# Patient Record
(demographics unavailable — no encounter records)

---

## 2024-11-26 NOTE — ADDENDUM
[FreeTextEntry1] : Documented by Dg Murphy acting as a scribe for Dr. Gm Torre on 11/26/2024. All medical record entries made by the Scribe were at my, Dr. Gm Torre's, direction and personally dictated by me on 11/26/2024. I have reviewed the chart and agree that the record accurately reflects my personal performance of the history, physical exam, assessment and plan. I have also personally directed, reviewed, and agree with the discharge instructions.

## 2024-11-26 NOTE — PROCEDURE
[FreeTextEntry1] : Full PFT reveals normal flows; FEV1 was 3.87 L which is 104% of predicted; normal lung volumes; normal diffusion at 29.02, which is 98% of predicted; normal flow volume loop.  PFTs were performed to evaluate for SOB  FENO was 29; a normal value being less than 25 Fractional exhaled nitric oxide (FENO) is regarded as a simple, noninvasive method for assessing eosinophilic airway inflammation. Produced by a variety of cells within the lung, nitric oxide (NO) concentrations are generally low in healthy individuals. However, high concentrations of NO appear to be involved in nonspecific host defense mechanisms and chronic inflammatory diseases such as asthma. The American Thoracic Society (ATS) therefore has recommended using FENO to aid in the diagnosis and monitoring of eosinophilic airway inflammation and asthma, and for identifying steroid responsive individuals whose chronic respiratory symptoms may be caused by airway inflammation.

## 2024-11-26 NOTE — HISTORY OF PRESENT ILLNESS
[TextBox_4] : Mr. SHAWN PINK is a 32 year old, non-cigarette smoking, male. He has past medical history of Covid-19 (12/2021 - no hospitalization), Exercise induced asthma, Seasonal allergies (Spring known trigger), & GERD. 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 He presents to the office for follow-up visit.  -he notes feeling generally well -he notes he is recovering from a cold -he notes new DD for his GISELL -he notes pending repeat sleep study -he notes his vitamin D level is low -he notes losing weight is a challenge and stamina is somewhat decreased from prior  -he denies any headaches, nausea, emesis, fever, chills, sweats, chest pain, chest pressure, coughing, wheezing, palpitations, diarrhea, constipation, dysphagia, vertigo, arthralgias, myalgias, leg swelling, itchy eyes, itchy ears, heartburn, reflux, or sour taste in the mouth.

## 2024-11-26 NOTE — PHYSICAL EXAM
[No Acute Distress] : no acute distress [Normal Oropharynx] : normal oropharynx [Normal Appearance] : normal appearance [No Neck Mass] : no neck mass [Normal Rate/Rhythm] : normal rate/rhythm [Normal S1, S2] : normal s1, s2 [No Murmurs] : no murmurs [No Resp Distress] : no resp distress [Clear to Auscultation Bilaterally] : clear to auscultation bilaterally [No Abnormalities] : no abnormalities [Benign] : benign [Normal Gait] : normal gait [No Clubbing] : no clubbing [No Cyanosis] : no cyanosis [No Edema] : no edema [FROM] : FROM [Normal Color/ Pigmentation] : normal color/ pigmentation [No Focal Deficits] : no focal deficits [Oriented x3] : oriented x3 [Normal Affect] : normal affect [TextBox_68] : I:E ratio 1:3; clear

## 2024-11-26 NOTE — ASSESSMENT
[FreeTextEntry1] : Mr. SHAWN PINK is a 32 year old, non-cigarette smoking, male with a past medical history of Covid-19 (12/2021 - no hospitalization), Exercise induced asthma, Seasonal allergies (Spring known trigger), & GERD, elevated IgE, elevated LFT. 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 Avoidance of cold air, extremes of temperature, rescue inhaler should be used before exercise. Order of medication reviewed with patient. Recommended use of a cool mist humidifier in the bedroom. - Asthma is believed to be caused by inherited (genetic) and environmental factor, but its exact cause is unknown.  - Asthma may be triggered by allergens, lung infections, or irritants in the air. Asthma triggers are different for each person  problem 1A: Elevated IgE (377) -Xolair candidate if necessary -Xolair is a recombinant DNA- derived humanized IgG1K monoclonal antibody that selectively binds ot human immunoglobulin E (IgE). Xolair is produced by a Chinese hamster ovary cell suspension culture in nutrient medium containing the antibiotic gentamicin. Gentamicin is not detectable in the final product. Xolair is a sterile, white, preservative free, lyophilized powder contained in a single use vial that is reconstituted with sterile water for suspension. Side effects include: wheezing, tightness of the chest, trouble breathing, hives, skin rash, feeling anxious or light-headed, fainting, warmth or tingling under skin, or swelling of face, lips, or tongue   problem 2: Allergies -add Olopatadine 0.2% eye drops BID Environmental measures for allergies were encouraged including mattress and pillow covers, air purifier, and environmental controls.   Problem 3: GERD -Rule of 2s: avoid eating too much, eating too late, eating too spicy, eating two hours before bed. -Things to avoid including overeating, spicy foods, tight clothing, eating within three hours of bed, this list is not all inclusive. -For treatment of reflux, possible options discussed including diet control, H2 blockers, PPIs, as well as coating motility agents discussed as treatment options. Timing of meals and proximity of last meal to sleep were discussed. If symptoms persist, a formal gastrointestinal evaluation is needed.   Problem 4: (+) GISELL (Risk factors: snoring, elevated Mallampati class, neck size 17) - mild -s/p home sleep study  -DD in place - pending new HSS -Sleep apnea is associated with adverse clinical consequences which can affect most organ systems. Cardiovascular disease risk includes arrhythmias, atrial fibrillation, hypertension, coronary artery disease, and stroke. Metabolic disorders include diabetes type 2, non-alcoholic fatty liver disease. Mood disorder especially depression; and cognitive decline especially in the elderly. Associations with chronic reflux/Alberto's esophagus some but now all inclusive. -Reasons include arousal consistent with hypopnea; respiratory events most prominent in REM sleep or supine position; therefore sleep staging and body position are important for accurate diagnosis and estimation of AHI.   Problem 5: poor breathing mechanics -Proper breathing techniques were reviewed with an emphasis of exhalation. Patient instructed to breath in for 1 second and out for four seconds. Patient was encouraged to not talk while walking.   problem 6: overweight / out of shape -Weight loss, exercise, and diet control were discussed and are highly encouraged. Treatment options are given such as, aqua therapy, and contacting a nutritionist. Recommended to use the elliptical, stationary bike, less use of treadmill.   problem 7: health maintenance -recommended yearly flu shot after October 15, 2024 -recommended strep pneumonia vaccines: Prevnar-20 vaccine, followed by Pneumo vaccine 23 one year following after 65 years old. -recommended early intervention for Upper Respiratory Infections (URIs) -recommended regular osteoporosis evaluations -recommended early dermatological evaluations -recommended after the age of 50 to the age of 70, colonoscopy every 5 years   F/U in 4-6 months He is encouraged to call with any changes, concerns, or questions

## 2025-05-05 NOTE — PROCEDURE
[FreeTextEntry1] : PFTs revealed normal flows; FEV1 was 3.54L, which is 95.2% of predicted; normal flow volume loop. PFTs were performed to evaluate for SOB  FENO was 31; a normal value being less than 25 Fractional exhaled nitric oxide (FENO) is regarded as a simple, noninvasive method for assessing eosinophilic airway inflammation. Produced by a variety of cells within the lung, nitric oxide (NO) concentrations are generally low in healthy individuals. However, high concentrations of NO appear to be involved in nonspecific host defense mechanisms and chronic inflammatory diseases such as asthma. The American Thoracic Society (ATS) therefore has recommended using FENO to aid in the diagnosis and monitoring of eosinophilic airway inflammation and asthma, and for identifying steroid responsive individuals whose chronic respiratory symptoms may be caused by airway inflammation.

## 2025-05-05 NOTE — HISTORY OF PRESENT ILLNESS
[FreeTextEntry1] : Here for follow up, CV risk assessment. No new complaints. Some exercise.  + chronic GARCIA he attrib to his asthma and weight. Working with nutritionist on dietary management for cholesterol and weight.  Diff controlling diet. No CP. Labs from July 2024 reviewed, mildly elev LDL.  Soc - no smoking Fam hist - 3 grandparents with CV disease in late 60s-early 70s.  Parents - HTN.

## 2025-05-05 NOTE — HISTORY OF PRESENT ILLNESS
[TextBox_4] : Mr. SHAWN PINK is a 33-year-old, non-cigarette smoking, male. He has past medical history of Covid-19 (12/2021 - no hospitalization), Exercise induced asthma, Seasonal allergies (Spring known trigger), & GERD. 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 His chief complaint is food allergies.  -he notes feeling generally well  -he notes exercising (one night of soccer every week, stationary bike twice weekly for 15 minutes) -he notes he uses Symbicort before he plays soccer -he notes he's been using Symbicort twice per day with allergies -he notes he takes Claritin QHS -he notes he's a new father -he notes improved sleep quality with the oral appliance -he notes he's lost 14 lbs from a few months ago -he notes his diet is improved -he notes he's on vitamin D and vitamin C -he notes he's in the [employer] program full-time, and he's working full-time -he notes his asthma and allergies are well-controlled, but at night he sometimes needs Benadryl on top of his Claritin -he denies heartburn/reflux -he notes he's traveling to [place] in July  -he denies any headaches, nausea, emesis, fever, chills, sweats, chest pain, chest pressure, coughing, wheezing, palpitations, diarrhea, constipation, dysphagia, vertigo, arthralgias, myalgias, leg swelling, itchy eyes, itchy ears, or sour taste in the mouth.

## 2025-05-05 NOTE — DISCUSSION/SUMMARY
[FreeTextEntry1] : Advised continue efforts for diet, exercise and wt loss. Lifestyle management for lipids. Discussed low yield for calcium scoring at this time. Follow up in 2 years.  [EKG obtained to assist in diagnosis and management of assessed problem(s)] : EKG obtained to assist in diagnosis and management of assessed problem(s)

## 2025-05-05 NOTE — ASSESSMENT
[FreeTextEntry1] : Mr. SHAWN PINK is a 33-year-old, non-cigarette smoking, male with a past medical history of Covid-19 (12/2021 - no hospitalization), Exercise induced asthma, Seasonal allergies (Spring known trigger), & GERD, elevated IgE, elevated LFT. 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 Avoidance of cold air, extremes of temperature, rescue inhaler should be used before exercise. Order of medication reviewed with patient. Recommended use of a cool mist humidifier in the bedroom. - Asthma is believed to be caused by inherited (genetic) and environmental factor, but its exact cause is unknown.  - Asthma may be triggered by allergens, lung infections, or irritants in the air. Asthma triggers are different for each person  problem 1A: Elevated IgE (377) -Xolair candidate if necessary -Xolair is a recombinant DNA- derived humanized IgG1K monoclonal antibody that selectively binds ot human immunoglobulin E (IgE). Xolair is produced by a Chinese hamster ovary cell suspension culture in nutrient medium containing the antibiotic gentamicin. Gentamicin is not detectable in the final product. Xolair is a sterile, white, preservative free, lyophilized powder contained in a single use vial that is reconstituted with sterile water for suspension. Side effects include: wheezing, tightness of the chest, trouble breathing, hives, skin rash, feeling anxious or light-headed, fainting, warmth or tingling under skin, or swelling of face, lips, or tongue   problem 2: Allergies -continue Olopatadine 0.2% eye drops BID -continue Claritin 10 mg QHS Environmental measures for allergies were encouraged including mattress and pillow covers, air purifier, and environmental controls.   Problem 3: GERD -Rule of 2s: avoid eating too much, eating too late, eating too spicy, eating two hours before bed. -Things to avoid including overeating, spicy foods, tight clothing, eating within three hours of bed, this list is not all inclusive. -For treatment of reflux, possible options discussed including diet control, H2 blockers, PPIs, as well as coating motility agents discussed as treatment options. Timing of meals and proximity of last meal to sleep were discussed. If symptoms persist, a formal gastrointestinal evaluation is needed.   Problem 4: (+) GISELL (Risk factors: snoring, elevated Mallampati class, neck size 17) - mild -s/p home sleep study  -DD in place- tolerating well and benefiting from use -Sleep apnea is associated with adverse clinical consequences which can affect most organ systems. Cardiovascular disease risk includes arrhythmias, atrial fibrillation, hypertension, coronary artery disease, and stroke. Metabolic disorders include diabetes type 2, non-alcoholic fatty liver disease. Mood disorder especially depression; and cognitive decline especially in the elderly. Associations with chronic reflux/Alberto's esophagus some but now all inclusive. -Reasons include arousal consistent with hypopnea; respiratory events most prominent in REM sleep or supine position; therefore sleep staging and body position are important for accurate diagnosis and estimation of AHI.   Problem 5: poor breathing mechanics -Proper breathing techniques were reviewed with an emphasis of exhalation. Patient instructed to breath in for 1 second and out for four seconds. Patient was encouraged to not talk while walking.   problem 6: overweight / out of shape -Weight loss, exercise, and diet control were discussed and are highly encouraged. Treatment options are given such as, aqua therapy, and contacting a nutritionist. Recommended to use the elliptical, stationary bike, less use of treadmill.   problem 7: health maintenance -s/p yearly flu shot 2024 -recommended strep pneumonia vaccines: Prevnar-20 vaccine, followed by Pneumo vaccine 23 one year following after 65 years old. -recommended early intervention for Upper Respiratory Infections (URIs) -recommended regular osteoporosis evaluations -recommended early dermatological evaluations -recommended after the age of 50 to the age of 70, colonoscopy every 5 years   F/P in 4-6 months He is encouraged to call with any changes, concerns, or questions

## 2025-05-05 NOTE — ADDENDUM
Handoff report recv'd from [address] [place] [address]'. [FreeTextEntry1] : Documented by Nadine Horan acting as a scribe for Dr. Gm Torre on 05/05/2025. All medical record entries made by the Scribe were at my, Dr. Gm Torre's, direction and personally dictated by me on 05/05/2025. I have reviewed the chart and agree that the record accurately reflects my personal performance of the history, physical exam, assessment and plan. I have also personally directed, reviewed, and agree with the discharge instructions.

## 2025-06-26 NOTE — HEALTH RISK ASSESSMENT
[Excellent] : ~his/her~  mood as  excellent [No] : In the past 12 months have you used drugs other than those required for medical reasons? No [No falls in past year] : Patient reported no falls in the past year [0] : 2) Feeling down, depressed, or hopeless: Not at all (0) [PHQ-2 Negative - No further assessment needed] : PHQ-2 Negative - No further assessment needed [Never] : Never [HIV Test offered] : HIV Test offered [None] : None [With Family] : lives with family [Employed] : employed [Graduate School] : graduate school [Significant Other] : lives with significant other [# Of Children ___] : has [unfilled] children [Sexually Active] : sexually active [Smoke Detector] : smoke detector [Carbon Monoxide Detector] : carbon monoxide detector [Seat Belt] :  uses seat belt [Sunscreen] : uses sunscreen [de-identified] : soccer 1x a week [[id number]] : 0 [High Risk Behavior] : no high risk behavior [Reports changes in hearing] : Reports no changes in hearing [Reports changes in vision] : Reports no changes in vision [Reports changes in dental health] : Reports no changes in dental health [HepatitisCDate] : 03/18 [HepatitisCComments] : negative [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

## 2025-06-26 NOTE — REVIEW OF SYSTEMS
[Recent Change In Weight] : ~T recent weight change [Negative] : Heme/Lymph [FreeTextEntry2] : 16 lb gain

## 2025-06-26 NOTE — PHYSICAL EXAM
[No Acute Distress] : no acute distress [Well Nourished] : well nourished [Well Developed] : well developed [Well-Appearing] : well-appearing [Normal Voice/Communication] : normal voice/communication [Normal Sclera/Conjunctiva] : normal sclera/conjunctiva [PERRL] : pupils equal round and reactive to light [EOMI] : extraocular movements intact [Normal Outer Ear/Nose] : the outer ears and nose were normal in appearance [Normal Oropharynx] : the oropharynx was normal [Normal TMs] : both tympanic membranes were normal [Supple] : supple [No Lymphadenopathy] : no lymphadenopathy [Thyroid Normal, No Nodules] : the thyroid was normal and there were no nodules present [No Respiratory Distress] : no respiratory distress  [Clear to Auscultation] : lungs were clear to auscultation bilaterally [No Accessory Muscle Use] : no accessory muscle use [Normal Rate] : normal rate  [Regular Rhythm] : with a regular rhythm [Normal S1, S2] : normal S1 and S2 [No Murmur] : no murmur heard [No Varicosities] : no varicosities [Pedal Pulses Present] : the pedal pulses are present [No Edema] : there was no peripheral edema [Normal Appearance] : normal in appearance [Soft] : abdomen soft [Non Tender] : non-tender [Non-distended] : non-distended [Normal Bowel Sounds] : normal bowel sounds [No Hernias] : no hernias [Normal Posterior Cervical Nodes] : no posterior cervical lymphadenopathy [Normal Anterior Cervical Nodes] : no anterior cervical lymphadenopathy [No CVA Tenderness] : no CVA  tenderness [No Spinal Tenderness] : no spinal tenderness [No Joint Swelling] : no joint swelling [Grossly Normal Strength/Tone] : grossly normal strength/tone [No Rash] : no rash [No Skin Lesions] : no skin lesions [Normal Gait] : normal gait [Coordination Grossly Intact] : coordination grossly intact [No Focal Deficits] : no focal deficits [Deep Tendon Reflexes (DTR)] : deep tendon reflexes were 2+ and symmetric [Normal Affect] : the affect was normal [Normal Mood] : the mood was normal [Normal Insight/Judgement] : insight and judgment were intact [Acne] : no acne [de-identified] : friendly

## 2025-06-26 NOTE — PLAN
[FreeTextEntry1] : Check routine fasting labs.  Discussed importance of diet / exercise and weight loss. Vaccines reviewed. Flu shot in Fall. HLD / borderline DM - check fasting lipids and A1c. Watch diet, lose weight. Transaminitis - check LFT's.  Asthma - no recent flares. On Symbicort / Albuterol PRN. Followed with pulmonary Dr. Torre.  RTO in 1 yr for annual exam or earlier PRN for acute care.